# Patient Record
Sex: FEMALE | Race: WHITE | NOT HISPANIC OR LATINO | URBAN - METROPOLITAN AREA
[De-identification: names, ages, dates, MRNs, and addresses within clinical notes are randomized per-mention and may not be internally consistent; named-entity substitution may affect disease eponyms.]

---

## 2017-06-23 ASSESSMENT — MIFFLIN-ST. JEOR
SCORE: 1066.17
SCORE: 1196.35

## 2017-06-24 ENCOUNTER — SURGERY - HEALTHEAST (OUTPATIENT)
Dept: SURGERY | Facility: HOSPITAL | Age: 62
End: 2017-06-24

## 2017-06-24 ENCOUNTER — ANESTHESIA - HEALTHEAST (OUTPATIENT)
Dept: SURGERY | Facility: HOSPITAL | Age: 62
End: 2017-06-24

## 2017-06-24 ASSESSMENT — MIFFLIN-ST. JEOR: SCORE: 1067.08

## 2017-06-25 ASSESSMENT — MIFFLIN-ST. JEOR: SCORE: 1068.44

## 2021-05-24 ENCOUNTER — RECORDS - HEALTHEAST (OUTPATIENT)
Dept: ADMINISTRATIVE | Facility: CLINIC | Age: 66
End: 2021-05-24

## 2021-05-25 ENCOUNTER — RECORDS - HEALTHEAST (OUTPATIENT)
Dept: ADMINISTRATIVE | Facility: CLINIC | Age: 66
End: 2021-05-25

## 2021-05-26 ENCOUNTER — RECORDS - HEALTHEAST (OUTPATIENT)
Dept: ADMINISTRATIVE | Facility: CLINIC | Age: 66
End: 2021-05-26

## 2021-05-31 VITALS — WEIGHT: 116.5 LBS | BODY MASS INDEX: 19.89 KG/M2 | HEIGHT: 64 IN

## 2021-06-11 NOTE — ANESTHESIA PROCEDURE NOTES
Peripheral Block    Patient location during procedure: pre-op  Start time: 6/24/2017 9:00 AM  End time: 6/24/2017 9:05 AM  post-op analgesia per surgeon order as noted in medical record  Staffing:  Performing  Anesthesiologist: VIPUL KNUTSON  Preanesthetic Checklist  Completed: patient identified, site marked, risks, benefits, and alternatives discussed, timeout performed, consent obtained, airway assessed, oxygen available, suction available, emergency drugs available and hand hygiene performed  Peripheral Block  Block type: brachial plexus, infraclavicular  Prep: ChloraPrep  Patient position: supine  Patient monitoring: cardiac monitor, continuous pulse oximetry, heart rate and blood pressure  Laterality: right  Injection technique: ultrasound guided    Ultrasound used to visualize needle placement in proximity to nerve being blocked: yes   Permanent ultrasound image captured for medical record  lidocaine 1% local anesthesia used for local skin prep  Needle  Needle type: echogenic   Needle gauge: 20G  Needle length: 4 in  no peripheral nerve catheter placed  Assessment  Injection assessment: no difficulty with injection, negative aspiration for heme, no paresthesia on injection and incremental injection

## 2021-06-11 NOTE — ANESTHESIA PREPROCEDURE EVALUATION
Anesthesia Evaluation      Patient summary reviewed   No history of anesthetic complications     Airway   Mallampati: I  Neck ROM: full   Pulmonary - normal exam    breath sounds clear to auscultation  (+) COPD, a smoker    ROS comment: S/p thoracotomy with LLL segmentectomy  Positive for histoplasma and mycoplasma                         Cardiovascular - negative ROS  Exercise tolerance: > or = 4 METS  Rhythm: regular  Rate: normal,         Neuro/Psych - negative ROS     Endo/Other - negative ROS      GI/Hepatic/Renal - negative ROS      Other findings: Swollen right wrist, WBC normalized, afebrile, on antibiotics.    Pt says arm is progressively red and swollen up arm mid forearm  Surgeon will evaluate.      Dental    (+) upper dentures    Comment: Missing most of bottom molars                       Anesthesia Plan  Planned anesthetic: general mask  Surgeon examined arm and requested PNB for pain.  Plan discussed with pt and CRNA.  Right infraclavicular nerve block for post operative pain, Gen/mask with TIVA.  ASA 3   Induction: intravenous   Anesthetic plan and risks discussed with: patient  Anesthesia plan special considerations: antiemetics,   Post-op plan: routine recovery

## 2021-06-11 NOTE — ANESTHESIA CARE TRANSFER NOTE
Last vitals:   Vitals:    06/24/17 1020   BP: 93/59   Pulse: 74   Resp: 14   Temp:    SpO2: 100%     Patient's level of consciousness is drowsy  Spontaneous respirations: yes  Maintains airway independently: yes  Dentition unchanged: yes  Oropharynx: oropharynx clear of all foreign objects    QCDR Measures:  ASA# 20 - Surgical Safety Checklist: ASA20A - Safety Checks Done  PQRS# 430 - Adult PONV Prevention: 4558F - Pt received => 2 anti-emetic agents (different classes) preop & intraop  ASA# 8 - Peds PONV Prevention: NA - Not pediatric patient, not GA or 2 or more risk factors NOT present  PQRS# 424 - Pat-op Temp Management: 4559F - At least one body temp DOCUMENTED => 35.5C or 95.9F within required timeframe  PQRS# 426 - PACU Transfer Protocol: - Transfer of care checklist used  ASA# 14 - Acute Post-op Pain: ASA14B - Patient did NOT experience pain >= 7 out of 10

## 2021-06-11 NOTE — ANESTHESIA POSTPROCEDURE EVALUATION
Patient: Cande ALBERT Relf  INCISION AND DRAINAGE RIGHT WRIST  AND CARPAL TUNNEL  Anesthesia type: general    Patient location: [facility] Surgical Floor  Last vitals:   Vitals:    06/24/17 1353   BP: 100/64   Pulse: 66   Resp: 16   Temp: 37.2  C (99  F)   SpO2: 97%     Post vital signs: stable  Level of consciousness: awake, alert and oriented  Post-anesthesia pain: pain controlled  Post-anesthesia nausea and vomiting: no  Pulmonary: unassisted, return to baseline  Cardiovascular: stable and blood pressure at baseline  Hydration: adequate  Anesthetic events: no    QCDR Measures:  ASA# 11 - Pat-op Cardiac Arrest: ASA11B - Patient did NOT experience unanticipated cardiac arrest  ASA# 12 - Pat-op Mortality Rate: ASA12B - Patient did NOT die  ASA# 13 - PACU Re-Intubation Rate: NA - No ETT / LMA used for case  ASA# 10 - Composite Anes Safety: ASA10A - No serious adverse event  ASA# 38 - New Corneal Injury: ASA38A - No new exposure keratitis or corneal abrasion in PACU    Additional Notes:

## 2021-06-16 PROBLEM — M25.431 WRIST SWELLING, RIGHT: Status: ACTIVE | Noted: 2017-06-23

## 2021-06-16 PROBLEM — M00.9: Status: ACTIVE | Noted: 2017-06-24

## 2021-06-16 PROBLEM — M25.439 WRIST SWELLING: Status: ACTIVE | Noted: 2017-06-23
